# Patient Record
Sex: FEMALE | Race: WHITE | NOT HISPANIC OR LATINO | URBAN - METROPOLITAN AREA
[De-identification: names, ages, dates, MRNs, and addresses within clinical notes are randomized per-mention and may not be internally consistent; named-entity substitution may affect disease eponyms.]

---

## 2024-06-26 ENCOUNTER — EMERGENCY (EMERGENCY)
Facility: HOSPITAL | Age: 29
LOS: 1 days | Discharge: HOME | End: 2024-06-26
Attending: EMERGENCY MEDICINE | Admitting: EMERGENCY MEDICINE
Payer: COMMERCIAL

## 2024-06-26 VITALS
HEART RATE: 114 BPM | DIASTOLIC BLOOD PRESSURE: 84 MMHG | SYSTOLIC BLOOD PRESSURE: 135 MMHG | WEIGHT: 115.08 LBS | RESPIRATION RATE: 19 BRPM | HEIGHT: 59 IN | OXYGEN SATURATION: 96 % | TEMPERATURE: 99 F

## 2024-06-26 PROCEDURE — 99284 EMERGENCY DEPT VISIT MOD MDM: CPT

## 2024-06-26 PROCEDURE — 99283 EMERGENCY DEPT VISIT LOW MDM: CPT | Mod: 25

## 2024-06-26 PROCEDURE — 93010 ELECTROCARDIOGRAM REPORT: CPT

## 2024-06-26 PROCEDURE — 93005 ELECTROCARDIOGRAM TRACING: CPT

## 2024-06-26 RX ORDER — ALPRAZOLAM 0.25 MG
2 TABLET ORAL ONCE
Refills: 0 | Status: DISCONTINUED | OUTPATIENT
Start: 2024-06-26 | End: 2024-06-26

## 2024-06-26 NOTE — ED PROVIDER NOTE - PATIENT PORTAL LINK FT
You can access the FollowMyHealth Patient Portal offered by [facility] by registering at the following website: [email or web address]. By joining [employer]’s FollowMyHealth portal, you will also be able to view your health information using other applications (apps) compatible with our system.

## 2024-06-26 NOTE — ED PROVIDER NOTE - NSFOLLOWUPINSTRUCTIONS_ED_ALL_ED_FT
Depression    Depression is a mental illness that usually causes feelings of sadness, hopelessness, or helplessness. Some people with this disorder do not feel particularly sad but lose interest in doing things they used to enjoy. Major depressive disorder also can cause physical symptoms. It can interfere with work, school, relationships, and other normal everyday activities. If you were started on a medication, make sure to take exactly as prescribed and follow up with a psychiatrist So that this medication may be adjusted if needed.  We also recommend seeing a therapist to talk for your issues in a stepwise fashion for best results.    SEEK IMMEDIATE MEDICAL CARE IF YOU HAVE ANY OF THE FOLLOWING SYMPTOMS: thoughts about hurting or killing yourself, thoughts about hurting or killing somebody else, hallucinations, or worsening depression.

## 2024-06-26 NOTE — ED PROVIDER NOTE - OBJECTIVE STATEMENT
29-year-old female presents to the emergency department stating that she has been having anxiety and depression over the past month.  Loss of energy.  Anxious.  Patient states that she had a few days where she could not sleep and feels that those lost days of sleep have permanently ruined her.  She was started on quetiapine by her psychiatrist.  They did blood work including thyroid panel.  No abnormalities in the blood work that was performed.  Patient states that they increased her quetiapine from 25 mg to 50 mg and it has been about a week where she is taking this.  It helps so that she can sleep however she cannot stay asleep for the entire night.  They contacted the psychiatrist who states that they may increase her to 100 mg however that was not yet implemented.  There is no SI or HI.  Patient's father is at bedside.  No fever or chills.  No vomiting.  No chest pain.  Patient reporting palpitations generalized malaise.

## 2024-06-26 NOTE — ED PROVIDER NOTE - PHYSICAL EXAMINATION
General:     NAD  Lungs:     CTA b/l  CVS:     regular rhythm, mild tachycardia ~108bpm  Abd:    soft, nondistended, no masses palpable. Nontender.  Ext:   no deformities or edema.   Skin: no rash  Neuro: AAOx3, no sensory/motor deficits   No SI/HI

## 2024-06-26 NOTE — ED PROVIDER NOTE - CLINICAL SUMMARY MEDICAL DECISION MAKING FREE TEXT BOX
29-year-old female presents to the emergency department stating that she has been having anxiety and depression over the past month.  Loss of energy.  Anxious.  Patient states that she had a few days where she could not sleep and feels that those lost days of sleep have permanently ruined her.  She was started on quetiapine by her psychiatrist.  They did blood work including thyroid panel.  No abnormalities in the blood work that was performed.  Patient states that they increased her quetiapine from 25 mg to 50 mg and it has been about a week where she is taking this.  It helps so that she can sleep however she cannot stay asleep for the entire night.  They contacted the psychiatrist who states that they may increase her to 100 mg however that was not yet implemented.  There is no SI or HI.  Patient's father is at bedside.  No fever or chills.  No vomiting.  No chest pain.  Patient reporting palpitations generalized malaise.    Exam as stated.  No emergent findings at this time.  Patient advised to follow-up with psychiatry for further recommendations on medication regimen.  Will obtain EKG in emergency department    No acute findings on EKG.  Patient stable for discharge.  Advised to [place] back with her psychiatrist for further recommendations on medication regimen.  Also recommended therapy so that patient can talk out the issues that she feels she is experiencing. 29-year-old female presents to the emergency department stating that she has been having anxiety and depression over the past month.  Loss of energy.  Anxious.  Patient states that she had a few days where she could not sleep and feels that those lost days of sleep have permanently ruined her.  She was started on quetiapine by her psychiatrist.  They did blood work including thyroid panel.  No abnormalities in the blood work that was performed.  Patient states that they increased her quetiapine from 25 mg to 50 mg and it has been about a week where she is taking this.  It helps so that she can sleep however she cannot stay asleep for the entire night.  They contacted the psychiatrist who states that they may increase her to 100 mg however that was not yet implemented.  There is no SI or HI.  Patient's father is at bedside.  No fever or chills.  No vomiting.  No chest pain.  Patient reporting palpitations generalized malaise.    Exam as stated.  No emergent findings at this time.  Patient advised to follow-up with psychiatry for further recommendations on medication regimen.  Will obtain EKG in emergency department    No acute findings on EKG.  Patient stable for discharge.  Advised to [place] back with her psychiatrist for further recommendations on medication regimen.  Also recommended therapy so that patient can talk out the issues that she feels she is experiencing.     Offered xanax but pt refused.

## 2024-06-26 NOTE — ED ADULT TRIAGE NOTE - CHIEF COMPLAINT QUOTE
Pt c/o insomnia with loss of energy/anxiety started 1 month ago was started on sleep medication by PMD not helping ,saw psychiatrist last via telehealth and started on Queatipenine, stated she's having palpitations , denies SI/HI, c/o generalized body pains

## 2024-07-23 ENCOUNTER — EMERGENCY (EMERGENCY)
Facility: HOSPITAL | Age: 29
LOS: 1 days | Discharge: HOME | End: 2024-07-23
Admitting: EMERGENCY MEDICINE
Payer: COMMERCIAL

## 2024-07-23 VITALS
OXYGEN SATURATION: 95 % | RESPIRATION RATE: 18 BRPM | DIASTOLIC BLOOD PRESSURE: 74 MMHG | HEART RATE: 114 BPM | TEMPERATURE: 98 F | SYSTOLIC BLOOD PRESSURE: 125 MMHG

## 2024-07-23 VITALS
HEIGHT: 59 IN | OXYGEN SATURATION: 95 % | HEART RATE: 116 BPM | TEMPERATURE: 98 F | DIASTOLIC BLOOD PRESSURE: 85 MMHG | RESPIRATION RATE: 17 BRPM | SYSTOLIC BLOOD PRESSURE: 154 MMHG

## 2024-07-23 LAB
ADD ON TEST-SPECIMEN IN LAB: SIGNIFICANT CHANGE UP
ALBUMIN SERPL ELPH-MCNC: 4.3 G/DL — SIGNIFICANT CHANGE UP (ref 3.3–5)
ALP SERPL-CCNC: 75 U/L — SIGNIFICANT CHANGE UP (ref 40–120)
ALT FLD-CCNC: 18 U/L — SIGNIFICANT CHANGE UP (ref 4–33)
ANION GAP SERPL CALC-SCNC: 13 MMOL/L — SIGNIFICANT CHANGE UP (ref 7–14)
AST SERPL-CCNC: 22 U/L — SIGNIFICANT CHANGE UP (ref 4–32)
BASOPHILS # BLD AUTO: 0.03 K/UL — SIGNIFICANT CHANGE UP (ref 0–0.2)
BASOPHILS NFR BLD AUTO: 0.4 % — SIGNIFICANT CHANGE UP (ref 0–2)
BILIRUB SERPL-MCNC: 0.5 MG/DL — SIGNIFICANT CHANGE UP (ref 0.2–1.2)
BUN SERPL-MCNC: 11 MG/DL — SIGNIFICANT CHANGE UP (ref 7–23)
CALCIUM SERPL-MCNC: 9.4 MG/DL — SIGNIFICANT CHANGE UP (ref 8.4–10.5)
CHLORIDE SERPL-SCNC: 102 MMOL/L — SIGNIFICANT CHANGE UP (ref 98–107)
CK SERPL-CCNC: 207 U/L — HIGH (ref 25–170)
CO2 SERPL-SCNC: 21 MMOL/L — LOW (ref 22–31)
CREAT SERPL-MCNC: 0.57 MG/DL — SIGNIFICANT CHANGE UP (ref 0.5–1.3)
EGFR: 125 ML/MIN/1.73M2 — SIGNIFICANT CHANGE UP
EOSINOPHIL # BLD AUTO: 0.03 K/UL — SIGNIFICANT CHANGE UP (ref 0–0.5)
EOSINOPHIL NFR BLD AUTO: 0.4 % — SIGNIFICANT CHANGE UP (ref 0–6)
GLUCOSE SERPL-MCNC: 112 MG/DL — HIGH (ref 70–99)
HCT VFR BLD CALC: 41.1 % — SIGNIFICANT CHANGE UP (ref 34.5–45)
HGB BLD-MCNC: 13.9 G/DL — SIGNIFICANT CHANGE UP (ref 11.5–15.5)
IANC: 5.54 K/UL — SIGNIFICANT CHANGE UP (ref 1.8–7.4)
IMM GRANULOCYTES NFR BLD AUTO: 0.3 % — SIGNIFICANT CHANGE UP (ref 0–0.9)
LYMPHOCYTES # BLD AUTO: 0.85 K/UL — LOW (ref 1–3.3)
LYMPHOCYTES # BLD AUTO: 12.1 % — LOW (ref 13–44)
MAGNESIUM SERPL-MCNC: 2 MG/DL — SIGNIFICANT CHANGE UP (ref 1.6–2.6)
MCHC RBC-ENTMCNC: 29.9 PG — SIGNIFICANT CHANGE UP (ref 27–34)
MCHC RBC-ENTMCNC: 33.8 GM/DL — SIGNIFICANT CHANGE UP (ref 32–36)
MCV RBC AUTO: 88.4 FL — SIGNIFICANT CHANGE UP (ref 80–100)
MONOCYTES # BLD AUTO: 0.54 K/UL — SIGNIFICANT CHANGE UP (ref 0–0.9)
MONOCYTES NFR BLD AUTO: 7.7 % — SIGNIFICANT CHANGE UP (ref 2–14)
NEUTROPHILS # BLD AUTO: 5.54 K/UL — SIGNIFICANT CHANGE UP (ref 1.8–7.4)
NEUTROPHILS NFR BLD AUTO: 79.1 % — HIGH (ref 43–77)
NRBC # BLD: 0 /100 WBCS — SIGNIFICANT CHANGE UP (ref 0–0)
NRBC # FLD: 0 K/UL — SIGNIFICANT CHANGE UP (ref 0–0)
PLATELET # BLD AUTO: 285 K/UL — SIGNIFICANT CHANGE UP (ref 150–400)
POTASSIUM SERPL-MCNC: 3.8 MMOL/L — SIGNIFICANT CHANGE UP (ref 3.5–5.3)
POTASSIUM SERPL-SCNC: 3.8 MMOL/L — SIGNIFICANT CHANGE UP (ref 3.5–5.3)
PROT SERPL-MCNC: 8 G/DL — SIGNIFICANT CHANGE UP (ref 6–8.3)
RBC # BLD: 4.65 M/UL — SIGNIFICANT CHANGE UP (ref 3.8–5.2)
RBC # FLD: 12.3 % — SIGNIFICANT CHANGE UP (ref 10.3–14.5)
SODIUM SERPL-SCNC: 136 MMOL/L — SIGNIFICANT CHANGE UP (ref 135–145)
TROPONIN T, HIGH SENSITIVITY RESULT: 8 NG/L — SIGNIFICANT CHANGE UP
TSH SERPL-MCNC: 0.78 UIU/ML — SIGNIFICANT CHANGE UP (ref 0.27–4.2)
WBC # BLD: 7.01 K/UL — SIGNIFICANT CHANGE UP (ref 3.8–10.5)
WBC # FLD AUTO: 7.01 K/UL — SIGNIFICANT CHANGE UP (ref 3.8–10.5)

## 2024-07-23 PROCEDURE — 70450 CT HEAD/BRAIN W/O DYE: CPT | Mod: 26,MC

## 2024-07-23 PROCEDURE — 99285 EMERGENCY DEPT VISIT HI MDM: CPT

## 2024-07-23 PROCEDURE — 93010 ELECTROCARDIOGRAM REPORT: CPT

## 2024-07-23 RX ORDER — LORAZEPAM 0.5 MG
2 TABLET ORAL ONCE
Refills: 0 | Status: DISCONTINUED | OUTPATIENT
Start: 2024-07-23 | End: 2024-07-23

## 2024-07-23 RX ORDER — HYDROXYZINE PAMOATE 50 MG/1
50 CAPSULE ORAL ONCE
Refills: 0 | Status: COMPLETED | OUTPATIENT
Start: 2024-07-23 | End: 2024-07-23

## 2024-07-23 RX ADMIN — HYDROXYZINE PAMOATE 50 MILLIGRAM(S): 50 CAPSULE ORAL at 17:06

## 2024-07-23 RX ADMIN — Medication 2 MILLIGRAM(S): at 17:45

## 2024-07-23 RX ADMIN — Medication 2 MILLIGRAM(S): at 19:28

## 2024-07-23 NOTE — ED PROVIDER NOTE - PATIENT PORTAL LINK FT
You can access the FollowMyHealth Patient Portal offered by [facility] by registering at the following website: [email or web address]. By joining [employer]’s FollowMyHealth portal, you will also be able to view your health information using other applications (apps) compatible with our system. You can access the FollowMyHealth Patient Portal offered by [facility] by registering at the following website: [email or web address]. By joining [employer]’s FollowMyHealth portal, you will also be able to view your health information using other applications (apps) compatible with our system.

## 2024-07-23 NOTE — ED ADULT TRIAGE NOTE - CHIEF COMPLAINT QUOTE
pt ambulatory to triage,  tearful, shaking and anxious, states she wants a CT of her hear, she believes there is a blood clot making her unable to bend her heel. states she was recently placed on Seroquel and believes its not working.

## 2024-07-23 NOTE — ED BEHAVIORAL HEALTH NOTE - BEHAVIORAL HEALTH NOTE
anxiety   DR. Rishabh Robledo    XYZOfin 50 mg x   dR Olivares [phone]  Setraolinre 50 mg in AM   PM Hydorxine 50 MG   ZolpaM 5 mg    [facility]   Anxiety Panic Attack for 1 Month  No SI/HI   Not Sleeping, Hydroxine SW informed to obtain collateral from Father   All information  as per father. SW informed to obtain collateral from Father Bryan [phone]       All information  as per father.      Patient is a 28 YO single female that is domiciled with her parents and brother in the community. Patient is employed full time on  leave. Father reports that patient is experiencing Anxiety and Panic. Tearful. Not being able to sleep.   Denies, Suicidal ideation current and past, Homicidal ideation current and past .Auditory/ visual hallucinations/ command hallucinations No history Self-injurious behaviors ,Denies, Drugs or alcohol- current and past   Current medications. Seroquel, Quetiapine that helps her sleep. However, currently has difficulty sleeping. Patient has psychiatrist and or therapist that patient speaks to vertually. Father cannot recall names. Requesting our Psychiatrists evaluate her.  No past Medical problems Allergies Denies Legal issues/ access to guns or weapons. No Past psychiatric admissions. Denies Past trauma, Prior suicide attempts   Violence or aggressive behaviors- past and current   Active substances?   Who brought the patient in?    What is family seeking/ presentation?   On disability   Is patient on a monthly injection?   Safety? ROEL informed to obtain collateral from Father Bryan [phone]       All information  as per father.      Patient is a 29 Y.O. single female that is domiciled with her parents and brother in the community. Patient is employed full time on leave. Father reports that patient is experiencing Anxiety and Panic. Tearful. Not being able to sleep. Therapist encouraged patient to come to ER for further evaluation. Denies, Suicidal ideation current and past, Homicidal ideation current and past ,Auditory/ visual hallucinations/ command hallucinations No history Self-injurious behaviors ,Denies, Drugs or alcohol- current and past  Current medications. Seroquel, Quetiapine that helps her sleep. Could not recall current dose.  However, currently has difficulty sleeping. Patient has psychiatrist and or therapist that patient speaks to virtually. Father cannot recall names. Requesting our Psychiatrists evaluate her and improve her anxiety panic and scan her head. No past Medical problems Allergies Denies Legal issues/ access to guns or weapons. No Past psychiatric admissions. Denies Past trauma, Prior suicide attempts, Violence or aggressive behaviors- past and current,  Active substances. Father reports no safety issues. All above reported to BRYAN Michaels. SW remains available.

## 2024-07-23 NOTE — ED PROVIDER NOTE - CLINICAL SUMMARY MEDICAL DECISION MAKING FREE TEXT BOX
30 yo F PMH MDD, Anxiety p/w increased anxiety. Admits to having racing thoughts and only sleeping for 2 hours. Patient admits compliance with seroquel. Reports she is unable to bend her b/l heels and thinks there is a blood clot in her brain and so requesting ct head. Also admits her arms and legs feel heavy and inability to  comfortably raise hand and lift legs. Patient able to lift all extremities without difficulty against gravity. Also endorses on/off chest pain.  Denies fever, headache, dizziness, SI/HI/AH/VH, chills, shortness of breath, abdominal pain, sick contact or recent travel. Denies alcohol use or other drugs.   EKG, COVID, Trop, CPK  SW collateral  Neuro exam normal  Dispo as per

## 2024-07-23 NOTE — ED PROVIDER NOTE - NSFOLLOWUPINSTRUCTIONS_ED_ALL_ED_FT
Follow up with your PMD within 48-72 hrs. Show copies of your reports given to you.   Worsening, continued or new concerning symptoms return to the emergency department.    You have been given information necessary to follow up with the  [facility] ([facility]) Crisis center & other outpatient  psychiatric clinics within your community    • [facility] walk in Crisis centre  [address] [place] [address]  [phone] [email or web address]  Hours of operation:  Day	                                        Hours  Sunday                                  Closed  Monday                                9am - 3pm  Tuesday                                9am - 3pm  Wednesday                          9am - 3pm  Thursday                               9am - 3pm  Friday                                    9am - 3pm  Saturday                                Closed

## 2024-07-23 NOTE — ED ADULT NURSE NOTE - OBJECTIVE STATEMENT
Hx: Anxiety. Pt from home requesting  CT scan of head. Pt unable to sleep at night x1 month due to racing thoughts, psychiatrist prescribed Seroquel. Pt believed she had damaged her brain and might have a clot due to the sleepless nights prior to Seroquel. Pt unable to use L ankle/leg in full ROM since last night, Pt attributes to clot in her brain. Pt also endorses arms/legs feeling "heavy". Pt appears anxious and mentions being "scared". Pt presents kempt well, cooperative. Denies; SI, SIB , HI, hallucinations, ETOH/drug use, CP, SOB, confusion, numbness.

## 2025-06-13 NOTE — ED ADULT NURSE NOTE - DRUG PRE-SCREENING (DAST -1)
See plan for WPW, near syncope  
2 episodes within the last few months.  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   - Will check labs, EKG  - Repeat echo.  Prior echo in 2022 reviewed  -She was advised to follow-up with EP, Dr. Seo.  I will also reach out to EP to get the input    
On Metoprolol prn   Intermittent palpitation   Two near syncope episode -see plans above  
Statement Selected